# Patient Record
(demographics unavailable — no encounter records)

---

## 2024-06-04 NOTE — PROGRESS NOTES
NEW PATIENT INTAKE    Referral Diagnosis: Elevated Hgb and Hct      Referring Provider: Brenden Johnson MD    Primary Care Provider: Brenden Johnson MD    Family History of Cancer/ Hematology Disorders: Mother with unknown cancer and maternal grandfather with pancreatic cancer    Presenting Symptoms: Elevated Hgb and Hct    Chronological History of Pertinent Events:     42yo patient    3/11/24 - Met with PCP, Brenden Johnson MD (Proficient)  New patient; here to establish care  c/o Left arm rash; denies pain or itchiness or skin dryness, or flaking or redness or hives.  Assessment/Plan:  Patient doing well overall. Hx of HTN. No past history of surgeries or hospitalizations.  Rx: Prednisone provided.  RTC in 3 months.    3/27/24 - Abnormal labs (Proficient)  RBC - 5.7  Hgb - 16.7  Hct - 50.0  Triglycerides - 163  ALT - 51    A referral has been placed with [facility] for a Medical Hematology consultation and treatment.    (Proficient)                    Notes from Referring Provider: N/A    Presented at Tumor Board: No    Other Pertinent Information: N/A